# Patient Record
Sex: MALE | Race: AMERICAN INDIAN OR ALASKA NATIVE | NOT HISPANIC OR LATINO | Employment: UNEMPLOYED | URBAN - METROPOLITAN AREA
[De-identification: names, ages, dates, MRNs, and addresses within clinical notes are randomized per-mention and may not be internally consistent; named-entity substitution may affect disease eponyms.]

---

## 2018-06-14 ENCOUNTER — HOSPITAL ENCOUNTER (EMERGENCY)
Facility: MEDICAL CENTER | Age: 48
End: 2018-06-14
Attending: EMERGENCY MEDICINE
Payer: MEDICAID

## 2018-06-14 VITALS
RESPIRATION RATE: 18 BRPM | WEIGHT: 141.09 LBS | TEMPERATURE: 97.8 F | BODY MASS INDEX: 21.38 KG/M2 | OXYGEN SATURATION: 97 % | SYSTOLIC BLOOD PRESSURE: 138 MMHG | HEART RATE: 59 BPM | HEIGHT: 68 IN | DIASTOLIC BLOOD PRESSURE: 83 MMHG

## 2018-06-14 VITALS
TEMPERATURE: 97.8 F | RESPIRATION RATE: 20 BRPM | WEIGHT: 141 LBS | BODY MASS INDEX: 21.37 KG/M2 | HEIGHT: 68 IN | OXYGEN SATURATION: 100 % | DIASTOLIC BLOOD PRESSURE: 108 MMHG | SYSTOLIC BLOOD PRESSURE: 141 MMHG | HEART RATE: 69 BPM

## 2018-06-14 DIAGNOSIS — F11.93 OPIOID WITHDRAWAL (HCC): ICD-10-CM

## 2018-06-14 DIAGNOSIS — F11.93 NARCOTIC WITHDRAWAL (HCC): ICD-10-CM

## 2018-06-14 LAB
ANION GAP SERPL CALC-SCNC: 9 MMOL/L (ref 0–11.9)
BASOPHILS # BLD AUTO: 0.6 % (ref 0–1.8)
BASOPHILS # BLD: 0.07 K/UL (ref 0–0.12)
BUN SERPL-MCNC: 19 MG/DL (ref 8–22)
CALCIUM SERPL-MCNC: 10 MG/DL (ref 8.5–10.5)
CHLORIDE SERPL-SCNC: 109 MMOL/L (ref 96–112)
CO2 SERPL-SCNC: 25 MMOL/L (ref 20–33)
CREAT SERPL-MCNC: 0.62 MG/DL (ref 0.5–1.4)
EOSINOPHIL # BLD AUTO: 0.23 K/UL (ref 0–0.51)
EOSINOPHIL NFR BLD: 1.8 % (ref 0–6.9)
ERYTHROCYTE [DISTWIDTH] IN BLOOD BY AUTOMATED COUNT: 47.4 FL (ref 35.9–50)
GLUCOSE SERPL-MCNC: 97 MG/DL (ref 65–99)
HCT VFR BLD AUTO: 40.5 % (ref 42–52)
HGB BLD-MCNC: 14 G/DL (ref 14–18)
IMM GRANULOCYTES # BLD AUTO: 0.05 K/UL (ref 0–0.11)
IMM GRANULOCYTES NFR BLD AUTO: 0.4 % (ref 0–0.9)
LYMPHOCYTES # BLD AUTO: 3.65 K/UL (ref 1–4.8)
LYMPHOCYTES NFR BLD: 29.2 % (ref 22–41)
MCH RBC QN AUTO: 30.8 PG (ref 27–33)
MCHC RBC AUTO-ENTMCNC: 34.6 G/DL (ref 33.7–35.3)
MCV RBC AUTO: 89 FL (ref 81.4–97.8)
MONOCYTES # BLD AUTO: 1.29 K/UL (ref 0–0.85)
MONOCYTES NFR BLD AUTO: 10.3 % (ref 0–13.4)
NEUTROPHILS # BLD AUTO: 7.23 K/UL (ref 1.82–7.42)
NEUTROPHILS NFR BLD: 57.7 % (ref 44–72)
NRBC # BLD AUTO: 0 K/UL
NRBC BLD-RTO: 0 /100 WBC
PLATELET # BLD AUTO: 349 K/UL (ref 164–446)
PMV BLD AUTO: 8.5 FL (ref 9–12.9)
POTASSIUM SERPL-SCNC: 3.2 MMOL/L (ref 3.6–5.5)
RBC # BLD AUTO: 4.55 M/UL (ref 4.7–6.1)
SODIUM SERPL-SCNC: 143 MMOL/L (ref 135–145)
WBC # BLD AUTO: 12.5 K/UL (ref 4.8–10.8)

## 2018-06-14 PROCEDURE — 85025 COMPLETE CBC W/AUTO DIFF WBC: CPT

## 2018-06-14 PROCEDURE — 80048 BASIC METABOLIC PNL TOTAL CA: CPT

## 2018-06-14 PROCEDURE — 96372 THER/PROPH/DIAG INJ SC/IM: CPT

## 2018-06-14 PROCEDURE — 99284 EMERGENCY DEPT VISIT MOD MDM: CPT | Mod: 27

## 2018-06-14 PROCEDURE — 99284 EMERGENCY DEPT VISIT MOD MDM: CPT

## 2018-06-14 PROCEDURE — 700111 HCHG RX REV CODE 636 W/ 250 OVERRIDE (IP): Performed by: EMERGENCY MEDICINE

## 2018-06-14 RX ORDER — PHENOBARBITAL SODIUM 130 MG/ML
260 INJECTION, SOLUTION INTRAMUSCULAR; INTRAVENOUS ONCE
Status: COMPLETED | OUTPATIENT
Start: 2018-06-14 | End: 2018-06-14

## 2018-06-14 RX ADMIN — PHENOBARBITAL SODIUM 260 MG: 130 INJECTION INTRAMUSCULAR; INTRAVENOUS at 03:35

## 2018-06-14 ASSESSMENT — LIFESTYLE VARIABLES
DO YOU DRINK ALCOHOL: YES
TOTAL SCORE: 0
EVER FELT BAD OR GUILTY ABOUT YOUR DRINKING: NO
HAVE YOU EVER FELT YOU SHOULD CUT DOWN ON YOUR DRINKING: NO
EVER HAD A DRINK FIRST THING IN THE MORNING TO STEADY YOUR NERVES TO GET RID OF A HANGOVER: NO
TOTAL SCORE: 0
CONSUMPTION TOTAL: INCOMPLETE
TOTAL SCORE: 0
HAVE PEOPLE ANNOYED YOU BY CRITICIZING YOUR DRINKING: NO

## 2018-06-14 ASSESSMENT — PAIN SCALES - GENERAL: PAINLEVEL_OUTOF10: 7

## 2018-06-14 NOTE — DISCHARGE PLANNING
MSW met with pt who want to get established with a [facility] clinic. MSW to [facility]. Pt can walk in for an evaluation. MSW gave pt cab voucher#[phone] [facility]. Pt give list of methoadone clinics and substance abuse resources.

## 2018-06-14 NOTE — DISCHARGE INSTRUCTIONS
"Narcotic Withdrawal  When drug use interferes with normal living activities and relationships, it is abuse. Abuse includes problems with family and friends. Psychological dependence has developed when your mind tells you that the drug is needed. This is usually followed by a physical dependence in which you need more of the drug to get the same feeling or \"high.\" This is known as addiction or chemical dependency. Risk is greater when chemical dependency exists in the family.  SYMPTOMS   When tolerance to narcotics has developed, stopping of the narcotic suddenly can cause uncomfortable physical symptoms. Most of the time these are mild and consist of shakes or jitters (tremors) in the hands,a rapid heart rate, rapid breathing, and temperature. Sometimes these symptoms are associated with anxiety, panic attacks, and bad dreams. Other symptoms include:  · Irritability.  · Anxiety.  · Runny nose.  · \"Goose flesh.\"  · Diarrhea.  · Feeling sick to the stomach (nauseous).  · Muscle spasms.  · Sleeplessness.  · Chills.  · Sweats.  · Drug cravings.  · Confusion.  The severity of the withdrawal is based on the individual and varies from person to person. Many people choose to continue using narcotics to get rid of the discomfort of withdrawal. They also use to try to feel normal.  TREATMENT   Quitting an addiction means stopping use of all chemicals. This is hard but may save your life. With continual drug use, possible outcomes are often loss of self respect and esteem, violence, death, and eventually [facility] if the use of narcotics has led to the death of another.  Addiction cannot be cured, but it can be stopped. This often requires outside help and the care of professionals. Most hospitals and clinics can refer you to a specialized care center.  It is not necessary for you to go through the uncomfortable symptoms of withdrawal. Your caregiver can provide you with medications that will help you through this difficult " period. Try to avoid situations, friends, or alcohol, which may have made it possible for you to continue using narcotics in the past. Learn how to say no!  HOME CARE INSTRUCTIONS   · Drink fluids, get plenty of rest, and take hot baths.  · Medicines may be prescribed to help control withdrawal symptoms.  · Over-the-counter medicines may be helpful to control diarrhea or an upset stomach.  · If your problems resulted from taking prescription pain medicines, make sure you have a follow-up visit with your caregiver within the next few days. Be open about this problem.  · If you are dependent or addicted to street drugs, contact a local drug and alcohol treatment center or Narcotics Anonymous.  · Have someone with you to monitor your symptoms.  · Engage in healthy activities with friends who do not use drugs.  · Stay away from the drug scene.  It takes a long period of time to overcome addictions to all drugs. There may be times when you feel as though you want to use. Following loss of a physical addiction and going through withdrawal, you have conquered the most difficult part of getting rid of an addiction. Gradually, you will have a lessening of the craving that is telling you that you need narcotics to feel normal. Call your caregiver or a member of your support group if more support is needed. Learn who to talk to in your family and among your friends so that during these periods you can receive outside help.  SEEK IMMEDIATE MEDICAL CARE IF:   · You have vomiting that cannot be controlled, especially if you cannot keep liquids down.  · You are seeing things or hearing voices that are not really there (hallucinating).  · You have a seizure.  Document Released: 03/09/2004 Document Revised: 03/11/2013 Document Reviewed: 12/13/2009  ExitCare® Patient Information ©2014 [employer].

## 2018-06-14 NOTE — ED TRIAGE NOTES
"Pt discharged from ED this AM. Pt was supposed to \"get a ride\" to methadone clinic. Pt missed his 0500 appt this AM and now c/o body aches.   "

## 2018-06-14 NOTE — ED TRIAGE NOTES
".Alfredo Guzman  .47 y.o.  .  Chief Complaint   Patient presents with   • Drug Withdrawal     pt states he is withdrawling from heroin and morphine, last used 06/12 at midnight; vomiting, chills, shaking, \"i feel like i'm gonna have seizures\"; denies alcohol use     Patient ambulatory to triage [facility] for above complaints; NAD observed in triage. Pt has hx of back problems, denies further history. Pt has appointment today at 0500 with the methadone clinic in [facility] but doesn't think he can make it until then and is afraid that he will have a seizure before then. Pt is neurologically intact w/ no s/s of seizure at this time.   Patient A&O X4, speaking in full sentences, and responding appropriately to questions.   Patient placed in lobby and educated to notify staff of new or worsening symptoms.     "

## 2018-06-14 NOTE — ED PROVIDER NOTES
"ED Provider Note    Scribed for Nena Ly M.D. by Keke Lipscomb. 6/14/2018, 3:16 AM.    Primary care provider: Mamadou Mathews M.D.  Means of arrival: walk in   History obtained from: Patient  History limited by: none     CHIEF COMPLAINT  Chief Complaint   Patient presents with   • Drug Withdrawal     pt states he is withdrawling from heroin and morphine, last used 06/12 at midnight; vomiting, chills, shaking, \"i feel like i'm gonna have seizures\"; denies alcohol use       HPI  Alfredo Guzman is a 47 y.o. male who presents to the Emergency Department for evaluation of drug withdrawal onset 2 days ago. Patient reports that he is currently withdrawing from heroin and morphine. He reports associated nausea, vomiting, chills, a \"skin crawling\" sensation, diarrhea, back pain, and shaking. Patient does have an appointment with the methadone clinic but states his symptoms are worsening and he cannot handle it anymore. No complaints of hallucinations.     REVIEW OF SYSTEMS  Positives as above. Pertinent negatives include hallucinations.    E      PAST MEDICAL HISTORY   has a past medical history of Anxiety; Back pain; Depression; Psychiatric disorder; and Seizure disorder (HCC).    SURGICAL HISTORY   has a past surgical history that includes other orthopedic surgery (x2 ).    SOCIAL HISTORY  Social History   Substance Use Topics   • Smoking status: Current Every Day Smoker     Packs/day: 1.00     Years: 10.00   • Smokeless tobacco: Never Used   • Alcohol use No      History   Drug Use   • Types: Intravenous     Comment: herion and morphine        FAMILY HISTORY  History reviewed. No pertinent family history.    CURRENT MEDICATIONS  Reviewed. See Encounter Summary.     ALLERGIES  No Known Allergies    PHYSICAL EXAM  VITAL SIGNS: /83   Pulse 78   Temp 36.6 °C (97.8 °F)   Resp 18   Ht 1.727 m (5' 8\")   Wt 64 kg (141 lb 1.5 oz)   SpO2 99%   BMI 21.45 kg/m²    Pulse ox interpretation: I interpret this pulse ox as " "normal.  Constitutional: Patient is agitated. He appears uncomfortable and states he feels his \"skin is crawling\".   HENT: Normocephalic, Atraumatic, mildly dry mucous membranes. Poor dentition.   Eyes: Conjunctiva normal, non-icteric. Pupils are 8 mm bilaterally.   Heart: Regular rate and rythm, no murmurs.    Lungs: Clear to auscultation bilaterally. No resp distress, breath sounds equal b/l  Abdomen: Non-tender, non-distended, normal bowel sounds  Skin: Warm, Dry, No erythema, No rash.   Neurologic: Alert and oriented, Grossly non-focal. No hallucinations.    DIFFERENTIAL DIAGNOSIS AND WORK UP PLAN    3:16 AM Patient seen and examined at bedside. The patient presents with drug withdrawal and the differential diagnosis includes but is not limited to Acute dehydration, opoid withdrawal, acute kidney injury. Ordered for CBC, BMP to evaluate. Patient will be treated with phenobarbital injection 260 mg for his symptoms.     DIAGNOSTIC STUDIES / PROCEDURES     LABS  CBC with a mildly elevated white blood cell count otherwise within normal limits, BMP potassium 3.2 otherwise also normal  All labs were reviewed by me.    COURSE & MEDICAL DECISION MAKING  Pertinent Labs & Imaging studies reviewed. (See chart for details)    4:02 AM- Reviewed the patient's lab results.     4:32 AM- Re-examined; The patient is resting in bed. He is feeling improved. I discussed his above findings were overall unremarkable and plans for discharge.  He is no longer writhing and there is no evidence of hallucinations he is not overtly dehydrated and now severe electrolyte abnormalities, encouraged patient to follow up with the [facility] for his methadone which is where he is planning to go at 5 AM this morning.  He was instructed to return to the ED if his symptoms worsen. Patient understands and agrees. His vitals prior to discharge are:     /74   Pulse (!) 59   Temp 36.6 °C (97.8 °F)   Resp 18   Ht 1.727 m " "(5' 8\")   Wt 64 kg (141 lb 1.5 oz)   SpO2 97%   BMI 21.45 kg/m²     The patient will return for new or worsening symptoms and is stable at the time of discharge.    DISPOSITION:  Patient will be discharged home in stable condition.    FOLLOW UP:  Mamadou Mathews M.D.  [address]  [address]  [facility] [address] [phone]    Schedule an appointment as soon as possible for a visit      [facility], Emergency Dept  [address]  [facility] [address] [phone]    If symptoms worsen    [facility]  [address]  [address]  [facility] [id number] [phone]    Go to      FINAL IMPRESSION  1. Opioid withdrawal (HCC)        Keke SINCLAIR (Scribe), am scribing for, and in the presence of, Nena Ly M.D..    Electronically signed by: Keke Lipscomb (Scribe), 6/14/2018    Nena SINCLAIR M.D. personally performed the services described in this documentation, as scribed by Keke Lipscomb in my presence, and it is both accurate and complete.    The note accurately reflects work and decisions made by me.  Nena Ly  6/14/2018  5:35 AM    This dictation has been created using voice recognition software and/or scribes. The accuracy of the dictation is limited by the abilities of the software and the expertise of the scribes. I expect there may be some errors of grammar and possibly content. I made every attempt to manually correct the errors within my dictation. However, errors related to voice recognition software and/or scribes may still exist and should be interpreted within the appropriate context.    "

## 2018-06-14 NOTE — ED PROVIDER NOTES
"ED Provider Note    CHIEF COMPLAINT  Chief Complaint   Patient presents with   • Other       HPI  Alfredo Guzman is a 47 y.o. male who presents because he missed his appointment at the methadone clinic. The patient was here last night for narcotic withdrawal. He is treated with phenobarbital. He is not having any withdrawal symptoms at this time. He states that someone was supposed to pick him up to take him to his 5 AM appointment and they never picked him up. He therefore has come here not knowing what else to do. He has no physical complaints at this time.    REVIEW OF SYSTEMS  See HPI for further details. All other systems are negative.     PAST MEDICAL HISTORY  Past Medical History:   Diagnosis Date   • Anxiety    • Back pain    • Depression    • Psychiatric disorder    • Seizure disorder (HCC)        FAMILY HISTORY  History reviewed. No pertinent family history.    SOCIAL HISTORY  Social History     Social History   • Marital status: Single     Spouse name: N/A   • Number of children: N/A   • Years of education: N/A     Social History Main Topics   • Smoking status: Current Every Day Smoker     Packs/day: 1.00     Years: 10.00   • Smokeless tobacco: Never Used   • Alcohol use No   • Drug use: Yes     Types: Intravenous      Comment: herion and morphine    • Sexual activity: Not on file     Other Topics Concern   • Not on file     Social History Narrative   • No narrative on file       SURGICAL HISTORY  Past Surgical History:   Procedure Laterality Date   • OTHER ORTHOPEDIC SURGERY  x2     low back surgery \"on my discs\"       CURRENT MEDICATIONS  Home Medications     Reviewed by Ericka Loaiza RTEX (Registered Nurse) on 06/14/18 at 0941  Med List Status: Partial   Medication Last Dose Status   gabapentin (NEURONTIN) 300 MG CAPS prn Active   lamotrigine (LAMICTAL) 25 MG TABS Taking Active   lidocaine (LIDODERM) 5 % Patch  Active   methylPREDNISolone (MEDROL, ANDREINA,) 4 MG tablet  Active   penicillin v potassium " "(VEETID) 500 MG TABS Taking Active                ALLERGIES  No Known Allergies    PHYSICAL EXAM  VITAL SIGNS: /108   Pulse 69   Temp 36.6 °C (97.8 °F)   Resp 20   Ht 1.727 m (5' 8\")   Wt 64 kg (141 lb)   SpO2 100%   BMI 21.44 kg/m²     Constitutional: Well developed, Well nourished, No acute distress, Non-toxic appearance.   HENT: Normocephalic, Atraumatic.   Eyes:  EOMI, Conjunctiva normal, No discharge.   Cardiovascular: Normal heart rate.   Thorax & Lungs: No respiratory distress.  Skin: Warm, Dry.   Musculoskeletal: Good range of motion in all major joints.  Neurologic: Awake alert.    COURSE & MEDICAL DECISION MAKING  Pertinent Labs & Imaging studies reviewed. (See chart for details)  This is a 47-year-old here for evaluation of narcotic withdrawal. He was seen here last night for symptoms of withdrawal. His laboratory workup was unremarkable. He was treated with phenobarbital. He currently is not having active withdrawal symptoms. I have discussed the case with our . At this point the patient will be provided transport to the methadone clinic were he is able to walk in for evaluation and establish care.    FINAL IMPRESSION  1. Evaluation of narcotic withdrawal  2.   3.         Electronically signed by: Emerson Ma, 6/14/2018 10:10 AM    "

## 2018-06-14 NOTE — ED NOTES
.Patient states understanding of discharge instructions. Ambulated with steady gait out of ED. Personal belongings with patient. Patient given cab voucher for transport to methadone clinic by

## 2018-06-14 NOTE — ED NOTES
Patient given discharge teaching and education. Given chance to ask questions, all questions answered. Educated patient of signs and symptoms to returned to ER, and educated patient they can return for any concerning symptoms. Patient states they have their belongings. Denies additional needs at this time. Reports pain is well controlled. Patient monitored every hour and PRN for safety and comfort. Patient ambulatory out of department.

## 2018-10-01 ENCOUNTER — HOSPITAL ENCOUNTER (EMERGENCY)
Dept: HOSPITAL 8 - ED | Age: 48
Discharge: HOME | End: 2018-10-01
Payer: COMMERCIAL

## 2018-10-01 VITALS — SYSTOLIC BLOOD PRESSURE: 108 MMHG | DIASTOLIC BLOOD PRESSURE: 59 MMHG

## 2018-10-01 VITALS — HEIGHT: 68 IN | BODY MASS INDEX: 23.05 KG/M2 | WEIGHT: 152.12 LBS

## 2018-10-01 DIAGNOSIS — F17.200: ICD-10-CM

## 2018-10-01 DIAGNOSIS — S80.12XA: Primary | ICD-10-CM
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
## 2018-10-01 DIAGNOSIS — Z60.2: ICD-10-CM
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
## 2018-10-01 PROCEDURE — 73590 X-RAY EXAM OF LOWER LEG: CPT

## 2018-10-01 PROCEDURE — 73610 X-RAY EXAM OF ANKLE: CPT

## 2018-10-01 PROCEDURE — 96372 THER/PROPH/DIAG INJ SC/IM: CPT

## 2018-10-01 PROCEDURE — 99284 EMERGENCY DEPT VISIT MOD MDM: CPT

## 2018-10-01 SDOH — SOCIAL STABILITY - SOCIAL INSECURITY: PROBLEMS RELATED TO LIVING ALONE: Z60.2

## 2018-12-21 ENCOUNTER — HOSPITAL ENCOUNTER (EMERGENCY)
Dept: HOSPITAL 8 - ED | Age: 48
Discharge: HOME | End: 2018-12-21
Payer: MEDICAID

## 2018-12-21 VITALS — SYSTOLIC BLOOD PRESSURE: 109 MMHG | DIASTOLIC BLOOD PRESSURE: 61 MMHG

## 2018-12-21 VITALS — WEIGHT: 157.63 LBS | HEIGHT: 68 IN | BODY MASS INDEX: 23.89 KG/M2

## 2018-12-21 DIAGNOSIS — J20.8: Primary | ICD-10-CM

## 2018-12-21 PROCEDURE — 99283 EMERGENCY DEPT VISIT LOW MDM: CPT

## 2018-12-21 PROCEDURE — 93005 ELECTROCARDIOGRAM TRACING: CPT

## 2018-12-21 PROCEDURE — 71046 X-RAY EXAM CHEST 2 VIEWS: CPT

## 2019-02-28 ENCOUNTER — APPOINTMENT (OUTPATIENT)
Dept: PHYSICAL THERAPY | Facility: REHABILITATION | Age: 49
End: 2019-02-28
Payer: MEDICAID

## 2019-03-04 ENCOUNTER — HOSPITAL ENCOUNTER (EMERGENCY)
Dept: HOSPITAL 8 - ED | Age: 49
Discharge: HOME | End: 2019-03-04
Payer: MEDICAID

## 2019-03-04 VITALS — WEIGHT: 165.35 LBS | BODY MASS INDEX: 25.06 KG/M2 | HEIGHT: 68 IN

## 2019-03-04 VITALS — SYSTOLIC BLOOD PRESSURE: 120 MMHG | DIASTOLIC BLOOD PRESSURE: 74 MMHG

## 2019-03-04 DIAGNOSIS — R05: Primary | ICD-10-CM

## 2019-03-04 DIAGNOSIS — F17.200: ICD-10-CM

## 2019-03-04 PROCEDURE — 99283 EMERGENCY DEPT VISIT LOW MDM: CPT

## 2019-03-04 PROCEDURE — 71046 X-RAY EXAM CHEST 2 VIEWS: CPT

## 2019-03-13 ENCOUNTER — HOSPITAL ENCOUNTER (OUTPATIENT)
Dept: RADIOLOGY | Facility: MEDICAL CENTER | Age: 49
End: 2019-03-13
Attending: NURSE PRACTITIONER
Payer: MEDICAID

## 2019-03-13 VITALS
SYSTOLIC BLOOD PRESSURE: 103 MMHG | DIASTOLIC BLOOD PRESSURE: 57 MMHG | OXYGEN SATURATION: 91 % | HEIGHT: 68 IN | TEMPERATURE: 98.7 F | BODY MASS INDEX: 22.73 KG/M2 | RESPIRATION RATE: 16 BRPM | WEIGHT: 150 LBS | HEART RATE: 54 BPM

## 2019-03-13 DIAGNOSIS — M54.5 LOW BACK PAIN, UNSPECIFIED BACK PAIN LATERALITY, UNSPECIFIED CHRONICITY, WITH SCIATICA PRESENCE UNSPECIFIED: ICD-10-CM

## 2019-03-13 PROCEDURE — 01922 ANES N-INVAS IMG/RADJ THER: CPT

## 2019-03-13 PROCEDURE — 72148 MRI LUMBAR SPINE W/O DYE: CPT

## 2019-03-13 PROCEDURE — 700111 HCHG RX REV CODE 636 W/ 250 OVERRIDE (IP)

## 2019-03-13 NOTE — PROGRESS NOTES
Pt ambulatory to MRI Nurses station for scheduled MRI with Anesthesia.  PIV established.  Pt spoke with Dr. Oleary.  Consent signed.  MRI completed.  To recovery.  Tolerated juice.  Discharge instructions given to pt and  with verbalized understanding.  Pt ambulatory out of the Imaging Department.  Lynn, pt's friend, to drive pt home.

## 2019-03-13 NOTE — DISCHARGE INSTRUCTIONS
MRI ADULT DISCHARGE INSTRUCTIONS    You have been medicated today for your scan. Please follow the instructions below to ensure your safe recovery. If you have any questions or problems, feel free to call us at [phone] or [phone].     1.   Have someone stay with you to assist you as needed.    2.   Do not drive or operate any mechanical devices.    3.   Do not perform any activity that requires concentration. Make no major decisions over the next 24 hours.     4.   Be careful changing positions from laying down to sitting or standing, as you may become dizzy.     5.   Do not drink alcohol for 48 hours.    6.   There are no restrictions for eating your normal meals. Drink fluids.    7.   You may continue your usual medications for pain, tranquilizers, muscle relaxants or sedatives when awake.     8.   Tomorrow, you may continue your normal daily activities.     9.   Pressure dressing on 10 - 15 minutes. If swelling or bleeding occurs when removed, continue placing direct pressure on injection site for another 5 minutes, or until bleeding stops.     I have been informed of and understand the above discharge instructions.

## 2019-03-14 ENCOUNTER — PHYSICAL THERAPY (OUTPATIENT)
Dept: PHYSICAL THERAPY | Facility: REHABILITATION | Age: 49
End: 2019-03-14
Attending: NURSE PRACTITIONER
Payer: MEDICAID

## 2019-03-14 DIAGNOSIS — M54.5 LOW BACK PAIN, UNSPECIFIED BACK PAIN LATERALITY, UNSPECIFIED CHRONICITY, WITH SCIATICA PRESENCE UNSPECIFIED: ICD-10-CM

## 2019-03-14 PROCEDURE — 97162 PT EVAL MOD COMPLEX 30 MIN: CPT

## 2019-03-14 ASSESSMENT — ENCOUNTER SYMPTOMS
PAIN TIMING: ALL DAY
EXACERBATED BY: PROLONGED STANDING
QUALITY: BURNING
PAIN SCALE AT HIGHEST: 9
PAIN SCALE: 9
PAIN TIMING: CONSTANT
PAIN SCALE AT LOWEST: 9
EXACERBATED BY: PROLONGED SITTING
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
## 2019-03-20 ENCOUNTER — HOSPITAL ENCOUNTER (EMERGENCY)
Dept: HOSPITAL 8 - ED | Age: 49
Discharge: HOME | End: 2019-03-20
Payer: MEDICAID

## 2019-03-20 VITALS — SYSTOLIC BLOOD PRESSURE: 134 MMHG | DIASTOLIC BLOOD PRESSURE: 82 MMHG

## 2019-03-20 VITALS — HEIGHT: 68 IN | WEIGHT: 159.17 LBS | BODY MASS INDEX: 24.12 KG/M2

## 2019-03-20 DIAGNOSIS — R11.2: Primary | ICD-10-CM

## 2019-03-20 DIAGNOSIS — F17.200: ICD-10-CM

## 2019-03-20 PROCEDURE — 99283 EMERGENCY DEPT VISIT LOW MDM: CPT
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
## 2019-03-20 NOTE — NUR
Pt passed PO  challenge of 30 mL of water. Patient given discharge instructions 
and they have confirmed that they understand the instructions.  Patient 
ambulatory with steady gait. Pt left with prescription, discharge paperwork, 
and all personal belongings.

## 2019-03-20 NOTE — NUR
Pt sitting on edge of riya. FARRAH. No N/V/D at this time. Pt states that 
nausea has subsided since recieving medicaiton from [facility]. No needs expressed at 
this time. All safety measures in place.

## 2019-03-21 ENCOUNTER — TELEPHONE (OUTPATIENT)
Dept: PHYSICAL THERAPY | Facility: REHABILITATION | Age: 49
End: 2019-03-21

## 2019-03-21 NOTE — OP THERAPY DISCHARGE SUMMARY
Outpatient Physical Therapy  DISCHARGE SUMMARY NOTE      [facility] [address]  [address]  [place] [address]  Phone:  [phone]  Fax:  [phone]    Date of Visit: 03/21/2019    Patient: Alfredo Guzman  YOB: 1970  MRN: [id number]     Referring Provider: No referring provider defined for this encounter.   Referring Diagnosis No admission diagnoses are documented for this encounter.     Physical Therapy Occurrence Codes    Date of onset of impairment:  2/19/19   Date physical therapy care plan established or reviewed:  3/14/19   Date physical therapy treatment started:  3/14/19          Functional Limitations and Severity Modifiers      Goal:     Discharge:         Your patient is being discharged from Physical Therapy with the following comments:   · Patient cancelled or missed more than 2 scheduled appointments/non-compliant    Comments:   Patient seen for initial evaluation only.  Please see initial evaluation for details.      Limitations Remaining:  Please see eval    Recommendations:  Discharge from PT    Shayy Brown PT, MSPT    Date: 3/21/2019

## 2019-03-26 ENCOUNTER — APPOINTMENT (OUTPATIENT)
Dept: PHYSICAL THERAPY | Facility: REHABILITATION | Age: 49
End: 2019-03-26
Attending: NURSE PRACTITIONER
Payer: MEDICAID

## 2019-03-30 ENCOUNTER — HOSPITAL ENCOUNTER (EMERGENCY)
Dept: HOSPITAL 8 - ED | Age: 49
Discharge: HOME | End: 2019-03-30
Payer: MEDICAID

## 2019-03-30 VITALS — BODY MASS INDEX: 24.29 KG/M2 | HEIGHT: 68 IN | WEIGHT: 160.28 LBS

## 2019-03-30 VITALS — DIASTOLIC BLOOD PRESSURE: 62 MMHG | SYSTOLIC BLOOD PRESSURE: 100 MMHG

## 2019-03-30 DIAGNOSIS — D72.829: ICD-10-CM

## 2019-03-30 DIAGNOSIS — R11.2: ICD-10-CM

## 2019-03-30 DIAGNOSIS — F17.200: ICD-10-CM

## 2019-03-30 DIAGNOSIS — R10.84: Primary | ICD-10-CM

## 2019-03-30 LAB
<PLATELET ESTIMATE>: ADEQUATE
<PLT MORPHOLOGY>: (no result)
ALBUMIN SERPL-MCNC: 3.4 G/DL (ref 3.4–5)
ALP SERPL-CCNC: 90 U/L (ref 45–117)
ALT SERPL-CCNC: 71 U/L (ref 12–78)
ANION GAP SERPL CALC-SCNC: 1 MMOL/L (ref 5–15)
BAND#(MANUAL): 0.55 X10^3/UL
BILIRUB DIRECT SERPL-MCNC: NORMAL MG/DL
BILIRUB SERPL-MCNC: 0.3 MG/DL (ref 0.2–1)
CALCIUM SERPL-MCNC: 8.7 MG/DL (ref 8.5–10.1)
CHLORIDE SERPL-SCNC: 110 MMOL/L (ref 98–107)
CREAT SERPL-MCNC: 0.81 MG/DL (ref 0.7–1.3)
EOS#(MANUAL): 0.36 X10^3/UL (ref 0–0.4)
EOS% (MANUAL): 2 % (ref 1–7)
ERYTHROCYTE [DISTWIDTH] IN BLOOD BY AUTOMATED COUNT: 15.1 % (ref 9.4–14.8)
LYMPH#(MANUAL): 1.64 X10^3/UL (ref 1–3.4)
LYMPHS% (MANUAL): 9 % (ref 22–44)
MCH RBC QN AUTO: 30.4 PG (ref 27.5–34.5)
MCHC RBC AUTO-ENTMCNC: 32.7 G/DL (ref 33.2–36.2)
MCV RBC AUTO: 93 FL (ref 81–97)
MD: YES
METAMYELOCYTES# (MANUAL): 0.18 X10^3/UL (ref 0–0)
METAMYELOCYTES% (MANUAL): 1 % (ref 0–1)
MONOS#(MANUAL): 0.55 X10^3/UL (ref 0.3–2.7)
MONOS% (MANUAL): 3 % (ref 2–9)
MYELOCYTES# (MANUAL): 0.18 X10^3/UL (ref 0–0)
MYELOCYTES% (MANUAL): 1 % (ref 0–0)
NEUTS BAND NFR BLD: 3 % (ref 0–7)
PLATELET # BLD AUTO: 318 X10^3/UL (ref 130–400)
PMV BLD AUTO: 7.3 FL (ref 7.4–10.4)
PROT SERPL-MCNC: 6.9 G/DL (ref 6.4–8.2)
RBC # BLD AUTO: 5 X10^6/UL (ref 4.38–5.82)
SEG#(MANUAL): 14.74 X10^3/UL (ref 1.8–6.8)
SEGS% (MANUAL): 81 % (ref 42–75)

## 2019-03-30 PROCEDURE — 80053 COMPREHEN METABOLIC PANEL: CPT

## 2019-03-30 PROCEDURE — 36415 COLL VENOUS BLD VENIPUNCTURE: CPT

## 2019-03-30 PROCEDURE — 83690 ASSAY OF LIPASE: CPT

## 2019-03-30 PROCEDURE — 85025 COMPLETE CBC W/AUTO DIFF WBC: CPT

## 2019-03-30 PROCEDURE — 99284 EMERGENCY DEPT VISIT MOD MDM: CPT

## 2019-03-30 PROCEDURE — 74021 RADEX ABDOMEN 3+ VIEWS: CPT

## 2019-03-30 NOTE — NUR
Pt BIB [facility]-c/o LUQ abd pain, N/V starting this morning. Pt states nausea 
improved after phenergan that was administered by EMS. Pt placed in gown, 
positioned for comfort in bed with warm blankets. Continuous oxygen and BP 
monitors applied, all safety measures observed.

## 2019-03-30 NOTE — NUR
PT STATES HE NEEDS TO LEAVE BECAUSE [facility] CLOSING. MD AWARE. PT 
GIVEN DISCHARGE INSTRUCTIONS. ENCOURAGED PT TO RETURN TO ER IF NOT FEELING 
BETTER. MADE AWARE OF ELEVATED WBC AND THAT HIS XRAY SHOWS CONSTIPATION

## 2019-04-02 ENCOUNTER — APPOINTMENT (OUTPATIENT)
Dept: PHYSICAL THERAPY | Facility: REHABILITATION | Age: 49
End: 2019-04-02
Attending: NURSE PRACTITIONER
Payer: MEDICAID

## 2019-04-04 ENCOUNTER — APPOINTMENT (OUTPATIENT)
Dept: PHYSICAL THERAPY | Facility: REHABILITATION | Age: 49
End: 2019-04-04
Attending: NURSE PRACTITIONER
Payer: MEDICAID

## 2019-04-09 ENCOUNTER — APPOINTMENT (OUTPATIENT)
Dept: PHYSICAL THERAPY | Facility: REHABILITATION | Age: 49
End: 2019-04-09
Attending: NURSE PRACTITIONER
Payer: MEDICAID

## 2019-04-11 ENCOUNTER — APPOINTMENT (OUTPATIENT)
Dept: PHYSICAL THERAPY | Facility: REHABILITATION | Age: 49
End: 2019-04-11
Attending: NURSE PRACTITIONER
Payer: MEDICAID

## 2019-04-16 ENCOUNTER — APPOINTMENT (OUTPATIENT)
Dept: PHYSICAL THERAPY | Facility: REHABILITATION | Age: 49
End: 2019-04-16
Attending: NURSE PRACTITIONER
Payer: MEDICAID

## 2019-04-23 ENCOUNTER — HOSPITAL ENCOUNTER (EMERGENCY)
Dept: HOSPITAL 8 - ED | Age: 49
Discharge: HOME | End: 2019-04-23
Payer: MEDICAID

## 2019-04-23 VITALS — BODY MASS INDEX: 23.36 KG/M2 | HEIGHT: 68 IN | WEIGHT: 154.1 LBS

## 2019-04-23 VITALS — DIASTOLIC BLOOD PRESSURE: 72 MMHG | SYSTOLIC BLOOD PRESSURE: 118 MMHG

## 2019-04-23 DIAGNOSIS — A08.4: Primary | ICD-10-CM

## 2019-04-23 DIAGNOSIS — F17.200: ICD-10-CM

## 2019-04-23 DIAGNOSIS — K02.9: ICD-10-CM

## 2019-04-23 LAB
<PLATELET ESTIMATE>: ADEQUATE
<PLT MORPHOLOGY>: (no result)
ALBUMIN SERPL-MCNC: 4.2 G/DL (ref 3.4–5)
ALP SERPL-CCNC: 148 U/L (ref 45–117)
ALT SERPL-CCNC: 263 U/L (ref 12–78)
ANION GAP SERPL CALC-SCNC: 6 MMOL/L (ref 5–15)
ANISOCYTOSIS BLD QL SMEAR: (no result)
BAND#(MANUAL): 1.49 X10^3/UL
BASOPHILS NFR BLD MANUAL: 1 % (ref 0–1)
BASOS#(MANUAL): 0.21 X10^3/UL (ref 0–0.1)
BILIRUB SERPL-MCNC: 0.3 MG/DL (ref 0.2–1)
CALCIUM SERPL-MCNC: 9.4 MG/DL (ref 8.5–10.1)
CHLORIDE SERPL-SCNC: 108 MMOL/L (ref 98–107)
CREAT SERPL-MCNC: 0.77 MG/DL (ref 0.7–1.3)
EOS#(MANUAL): 0.21 X10^3/UL (ref 0–0.4)
EOS% (MANUAL): 1 % (ref 1–7)
ERYTHROCYTE [DISTWIDTH] IN BLOOD BY AUTOMATED COUNT: 14.8 % (ref 9.4–14.8)
LYMPH#(MANUAL): 1.92 X10^3/UL (ref 1–3.4)
LYMPHS% (MANUAL): 9 % (ref 22–44)
MCH RBC QN AUTO: 30.9 PG (ref 27.5–34.5)
MCHC RBC AUTO-ENTMCNC: 33.6 G/DL (ref 33.2–36.2)
MCV RBC AUTO: 92 FL (ref 81–97)
MD: YES
MONOS#(MANUAL): 0.64 X10^3/UL (ref 0.3–2.7)
MONOS% (MANUAL): 3 % (ref 2–9)
NEUTS BAND NFR BLD: 7 % (ref 0–7)
PLATELET # BLD AUTO: 373 X10^3/UL (ref 130–400)
PMV BLD AUTO: 7.4 FL (ref 7.4–10.4)
PROT SERPL-MCNC: 8.2 G/DL (ref 6.4–8.2)
RBC # BLD AUTO: 5.58 X10^6/UL (ref 4.38–5.82)
SEG#(MANUAL): 16.83 X10^3/UL (ref 1.8–6.8)
SEGS% (MANUAL): 79 % (ref 42–75)

## 2019-04-23 PROCEDURE — 85025 COMPLETE CBC W/AUTO DIFF WBC: CPT

## 2019-04-23 PROCEDURE — 87040 BLOOD CULTURE FOR BACTERIA: CPT

## 2019-04-23 PROCEDURE — 86706 HEP B SURFACE ANTIBODY: CPT

## 2019-04-23 PROCEDURE — 86704 HEP B CORE ANTIBODY TOTAL: CPT

## 2019-04-23 PROCEDURE — 80053 COMPREHEN METABOLIC PANEL: CPT

## 2019-04-23 PROCEDURE — 87340 HEPATITIS B SURFACE AG IA: CPT

## 2019-04-23 PROCEDURE — 76700 US EXAM ABDOM COMPLETE: CPT

## 2019-04-23 PROCEDURE — 36415 COLL VENOUS BLD VENIPUNCTURE: CPT

## 2019-04-23 PROCEDURE — 86803 HEPATITIS C AB TEST: CPT

## 2019-04-23 PROCEDURE — 74177 CT ABD & PELVIS W/CONTRAST: CPT

## 2019-04-23 PROCEDURE — 99284 EMERGENCY DEPT VISIT MOD MDM: CPT

## 2019-04-23 PROCEDURE — 86708 HEPATITIS A ANTIBODY: CPT

## 2019-04-23 PROCEDURE — 96365 THER/PROPH/DIAG IV INF INIT: CPT

## 2019-04-23 PROCEDURE — 83690 ASSAY OF LIPASE: CPT

## 2019-04-23 NOTE — NUR
LUNCH RN: PT PRESENTING TO ER FOR N/V AND UPPER LEFT/EPIGASTRIC PAIN STARTING 
TODAY AS WELL AS LEFT LOWER JAW PAIN. PT REPORTS HEROIN USE DAY BEFORE 
YESTERDAY BUT IS ATTEMPTING TO STOP BY GOING TO [facility]. CONNECTED TO 
MONITORING, VSS. PT MEDCIATED PER MAR FOR NAUSEA. LABS COLLECTED AND MD 
ASSESSMENT COMPLETE. CALL LIGHT WITHIN REACH. AWAITING RESULTS AND RECHECK AT 
THIS TIME

## 2019-04-23 NOTE — NUR
PT. REMAINS MONITORED AND IS RESTING WITHOUT CONCERNS.  VSS.  SIDERAILS REMAIN 
UP X 2 WITH THE CALL LIGHT IN PLACE.  PT.'S HOB IS ELEVATED GREATER THAN 30 
DEGREES.

## 2019-08-11 ENCOUNTER — HOSPITAL ENCOUNTER (EMERGENCY)
Dept: HOSPITAL 8 - ED | Age: 49
Discharge: HOME | End: 2019-08-11
Payer: MEDICAID

## 2019-08-11 VITALS — WEIGHT: 150.13 LBS | BODY MASS INDEX: 22.24 KG/M2 | HEIGHT: 69 IN

## 2019-08-11 VITALS — SYSTOLIC BLOOD PRESSURE: 114 MMHG | DIASTOLIC BLOOD PRESSURE: 72 MMHG

## 2019-08-11 DIAGNOSIS — K02.9: Primary | ICD-10-CM

## 2019-08-11 DIAGNOSIS — K04.7: ICD-10-CM

## 2019-08-11 DIAGNOSIS — F17.200: ICD-10-CM

## 2019-08-11 PROCEDURE — 99283 EMERGENCY DEPT VISIT LOW MDM: CPT

## 2019-12-07 ENCOUNTER — HOSPITAL ENCOUNTER (EMERGENCY)
Dept: HOSPITAL 8 - ED | Age: 49
Discharge: HOME | End: 2019-12-07
Payer: MEDICAID

## 2019-12-07 VITALS — DIASTOLIC BLOOD PRESSURE: 51 MMHG | SYSTOLIC BLOOD PRESSURE: 134 MMHG

## 2019-12-07 VITALS — BODY MASS INDEX: 25.79 KG/M2 | WEIGHT: 170.2 LBS | HEIGHT: 68 IN

## 2019-12-07 DIAGNOSIS — F11.10: ICD-10-CM

## 2019-12-07 DIAGNOSIS — K08.89: ICD-10-CM

## 2019-12-07 DIAGNOSIS — K02.9: Primary | ICD-10-CM

## 2019-12-07 DIAGNOSIS — F17.200: ICD-10-CM

## 2019-12-07 PROCEDURE — 99284 EMERGENCY DEPT VISIT MOD MDM: CPT

## 2019-12-07 PROCEDURE — 64402: CPT

## 2020-02-05 ENCOUNTER — HOSPITAL ENCOUNTER (EMERGENCY)
Dept: HOSPITAL 8 - ED | Age: 50
Discharge: HOME | End: 2020-02-05
Payer: MEDICAID

## 2020-02-05 VITALS — WEIGHT: 163.14 LBS | HEIGHT: 68 IN | BODY MASS INDEX: 24.73 KG/M2

## 2020-02-05 VITALS — DIASTOLIC BLOOD PRESSURE: 74 MMHG | SYSTOLIC BLOOD PRESSURE: 124 MMHG

## 2020-02-05 DIAGNOSIS — K08.89: Primary | ICD-10-CM

## 2020-02-05 DIAGNOSIS — F17.200: ICD-10-CM

## 2020-02-05 PROCEDURE — 99283 EMERGENCY DEPT VISIT LOW MDM: CPT

## 2020-03-25 ENCOUNTER — HOSPITAL ENCOUNTER (EMERGENCY)
Dept: HOSPITAL 8 - ED | Age: 50
Discharge: HOME | End: 2020-03-25
Payer: MEDICAID

## 2020-03-25 VITALS — WEIGHT: 158.73 LBS | HEIGHT: 68 IN | BODY MASS INDEX: 24.06 KG/M2

## 2020-03-25 VITALS — DIASTOLIC BLOOD PRESSURE: 60 MMHG | SYSTOLIC BLOOD PRESSURE: 108 MMHG

## 2020-03-25 DIAGNOSIS — K59.00: Primary | ICD-10-CM

## 2020-03-25 DIAGNOSIS — R11.2: ICD-10-CM

## 2020-03-25 DIAGNOSIS — F17.210: ICD-10-CM

## 2020-03-25 LAB
<PLATELET ESTIMATE>: ADEQUATE
<PLT MORPHOLOGY>: (no result)
ALBUMIN SERPL-MCNC: 3.9 G/DL (ref 3.4–5)
ALP SERPL-CCNC: 101 U/L (ref 45–117)
ALT SERPL-CCNC: 16 U/L (ref 12–78)
ANION GAP SERPL CALC-SCNC: 5 MMOL/L (ref 5–15)
BAND#(MANUAL): 0.53 X10^3/UL
BILIRUB DIRECT SERPL-MCNC: NORMAL MG/DL
BILIRUB SERPL-MCNC: 0.3 MG/DL (ref 0.2–1)
CALCIUM SERPL-MCNC: 9.6 MG/DL (ref 8.5–10.1)
CHLORIDE SERPL-SCNC: 106 MMOL/L (ref 98–107)
CREAT SERPL-MCNC: 0.72 MG/DL (ref 0.7–1.3)
CULTURE INDICATED?: NO
ERYTHROCYTE [DISTWIDTH] IN BLOOD BY AUTOMATED COUNT: 14.7 % (ref 9.4–14.8)
LYMPH#(MANUAL): 3.2 X10^3/UL (ref 1–3.4)
LYMPHS% (MANUAL): 18 % (ref 22–44)
MCH RBC QN AUTO: 31 PG (ref 27.5–34.5)
MCHC RBC AUTO-ENTMCNC: 33 G/DL (ref 33.2–36.2)
MCV RBC AUTO: 94 FL (ref 81–97)
MD: YES
MICROSCOPIC: (no result)
MONOS#(MANUAL): 0.53 X10^3/UL (ref 0.3–2.7)
MONOS% (MANUAL): 3 % (ref 2–9)
NEUTS BAND NFR BLD: 3 % (ref 0–7)
PLATELET # BLD AUTO: 332 X10^3/UL (ref 130–400)
PMV BLD AUTO: 7.3 FL (ref 7.4–10.4)
PROT SERPL-MCNC: 8.3 G/DL (ref 6.4–8.2)
RBC # BLD AUTO: 4.97 X10^6/UL (ref 4.38–5.82)
REACTIVE LYMPHS # (MANUAL): 0.18 X10^3/UL (ref 0–0)
REACTIVE LYMPHS % (MANUAL): 1 % (ref 0–0)
SEG#(MANUAL): 13.35 X10^3/UL (ref 1.8–6.8)
SEGS% (MANUAL): 75 % (ref 42–75)

## 2020-03-25 PROCEDURE — 99285 EMERGENCY DEPT VISIT HI MDM: CPT

## 2020-03-25 PROCEDURE — 93005 ELECTROCARDIOGRAM TRACING: CPT

## 2020-03-25 PROCEDURE — 80307 DRUG TEST PRSMV CHEM ANLYZR: CPT

## 2020-03-25 PROCEDURE — 80053 COMPREHEN METABOLIC PANEL: CPT

## 2020-03-25 PROCEDURE — 36415 COLL VENOUS BLD VENIPUNCTURE: CPT

## 2020-03-25 PROCEDURE — 85025 COMPLETE CBC W/AUTO DIFF WBC: CPT

## 2020-03-25 PROCEDURE — 99406 BEHAV CHNG SMOKING 3-10 MIN: CPT

## 2020-03-25 PROCEDURE — 96372 THER/PROPH/DIAG INJ SC/IM: CPT

## 2020-03-25 PROCEDURE — 74177 CT ABD & PELVIS W/CONTRAST: CPT

## 2020-03-25 PROCEDURE — 83690 ASSAY OF LIPASE: CPT

## 2020-03-25 PROCEDURE — 81003 URINALYSIS AUTO W/O SCOPE: CPT

## 2020-03-25 PROCEDURE — 74022 RADEX COMPL AQT ABD SERIES: CPT

## 2020-03-25 NOTE — NUR
Patient BIB [facility] c/o vomiting x20 minutes. Patient states he could be detoxing 
from heroin but it feels different from the last time he detoxed. Last use last 
night. Patient states he ate a ramen bowl which also could have given him food 
poisoning. 

Patient presents vomiting. Respirations even and unlabored.

## 2020-04-09 ENCOUNTER — HOSPITAL ENCOUNTER (EMERGENCY)
Dept: HOSPITAL 8 - ED | Age: 50
Discharge: HOME | End: 2020-04-09
Payer: MEDICAID

## 2020-04-09 VITALS — WEIGHT: 166.89 LBS | BODY MASS INDEX: 25.29 KG/M2 | HEIGHT: 68 IN

## 2020-04-09 VITALS — SYSTOLIC BLOOD PRESSURE: 122 MMHG | DIASTOLIC BLOOD PRESSURE: 80 MMHG

## 2020-04-09 DIAGNOSIS — K02.9: Primary | ICD-10-CM

## 2020-04-09 PROCEDURE — 99283 EMERGENCY DEPT VISIT LOW MDM: CPT

## 2020-06-12 ENCOUNTER — HOSPITAL ENCOUNTER (EMERGENCY)
Dept: HOSPITAL 8 - ED | Age: 50
Discharge: HOME | End: 2020-06-12
Payer: MEDICAID

## 2020-06-12 VITALS — DIASTOLIC BLOOD PRESSURE: 96 MMHG | SYSTOLIC BLOOD PRESSURE: 142 MMHG

## 2020-06-12 VITALS — WEIGHT: 171.74 LBS | BODY MASS INDEX: 26.03 KG/M2 | HEIGHT: 68 IN

## 2020-06-12 VITALS — WEIGHT: 159.84 LBS | HEIGHT: 70 IN | BODY MASS INDEX: 22.88 KG/M2

## 2020-06-12 VITALS — DIASTOLIC BLOOD PRESSURE: 67 MMHG | SYSTOLIC BLOOD PRESSURE: 140 MMHG

## 2020-06-12 DIAGNOSIS — L29.9: Primary | ICD-10-CM

## 2020-06-12 DIAGNOSIS — F17.200: ICD-10-CM

## 2020-06-12 DIAGNOSIS — B35.6: Primary | ICD-10-CM

## 2020-06-12 DIAGNOSIS — F17.290: ICD-10-CM

## 2020-06-12 PROCEDURE — 99283 EMERGENCY DEPT VISIT LOW MDM: CPT

## 2020-06-12 PROCEDURE — 99406 BEHAV CHNG SMOKING 3-10 MIN: CPT

## 2020-06-12 NOTE — NUR
PT RESTING IN [facility] AT THIS TIME; FARRAH. PT VERBALIZES UNDERSTANDING OF ER 
PROCESS.

## 2020-07-06 ENCOUNTER — HOSPITAL ENCOUNTER (EMERGENCY)
Dept: HOSPITAL 8 - ED | Age: 50
End: 2020-07-06
Payer: MEDICAID

## 2020-07-06 VITALS — HEIGHT: 68 IN | WEIGHT: 167.11 LBS | BODY MASS INDEX: 25.33 KG/M2

## 2020-07-06 VITALS — SYSTOLIC BLOOD PRESSURE: 144 MMHG | DIASTOLIC BLOOD PRESSURE: 89 MMHG

## 2020-07-06 DIAGNOSIS — F17.200: ICD-10-CM
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
## 2020-07-06 DIAGNOSIS — T14.8XXA: Primary | ICD-10-CM
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
## 2020-07-06 DIAGNOSIS — Z72.9: ICD-10-CM

## 2020-07-06 PROCEDURE — 99283 EMERGENCY DEPT VISIT LOW MDM: CPT

## 2020-10-05 ENCOUNTER — HOSPITAL ENCOUNTER (EMERGENCY)
Dept: HOSPITAL 8 - ED | Age: 50
Discharge: HOME | End: 2020-10-05
Payer: MEDICAID

## 2020-10-05 VITALS — WEIGHT: 165.57 LBS | BODY MASS INDEX: 25.09 KG/M2 | HEIGHT: 68 IN

## 2020-10-05 VITALS — DIASTOLIC BLOOD PRESSURE: 79 MMHG | SYSTOLIC BLOOD PRESSURE: 162 MMHG

## 2020-10-05 DIAGNOSIS — K08.89: Primary | ICD-10-CM

## 2020-10-05 DIAGNOSIS — B86: ICD-10-CM

## 2020-10-05 PROCEDURE — 99283 EMERGENCY DEPT VISIT LOW MDM: CPT

## 2020-11-07 ENCOUNTER — HOSPITAL ENCOUNTER (EMERGENCY)
Dept: HOSPITAL 8 - ED | Age: 50
Discharge: HOME | End: 2020-11-07
Payer: MEDICAID

## 2020-11-07 VITALS — BODY MASS INDEX: 26.5 KG/M2 | WEIGHT: 174.83 LBS | HEIGHT: 68 IN

## 2020-11-07 VITALS — SYSTOLIC BLOOD PRESSURE: 143 MMHG | DIASTOLIC BLOOD PRESSURE: 67 MMHG

## 2020-11-07 DIAGNOSIS — Z76.0: ICD-10-CM

## 2020-11-07 DIAGNOSIS — K02.9: Primary | ICD-10-CM

## 2020-11-07 PROCEDURE — 99283 EMERGENCY DEPT VISIT LOW MDM: CPT

## 2020-11-24 ENCOUNTER — HOSPITAL ENCOUNTER (EMERGENCY)
Dept: HOSPITAL 8 - ED | Age: 50
Discharge: HOME | End: 2020-11-24
Payer: MEDICAID

## 2020-11-24 VITALS — BODY MASS INDEX: 25.46 KG/M2 | HEIGHT: 68 IN | WEIGHT: 167.99 LBS

## 2020-11-24 VITALS — DIASTOLIC BLOOD PRESSURE: 70 MMHG | SYSTOLIC BLOOD PRESSURE: 140 MMHG

## 2020-11-24 DIAGNOSIS — B86: ICD-10-CM

## 2020-11-24 DIAGNOSIS — K08.89: Primary | ICD-10-CM

## 2020-11-24 DIAGNOSIS — F17.200: ICD-10-CM

## 2020-11-24 PROCEDURE — 99283 EMERGENCY DEPT VISIT LOW MDM: CPT

## 2021-01-25 ENCOUNTER — HOSPITAL ENCOUNTER (EMERGENCY)
Dept: HOSPITAL 8 - ED | Age: 51
Discharge: HOME | End: 2021-01-25
Payer: MEDICAID

## 2021-01-25 VITALS — WEIGHT: 174.17 LBS | BODY MASS INDEX: 26.4 KG/M2 | HEIGHT: 68 IN

## 2021-01-25 VITALS — DIASTOLIC BLOOD PRESSURE: 80 MMHG | SYSTOLIC BLOOD PRESSURE: 138 MMHG

## 2021-01-25 DIAGNOSIS — B35.6: Primary | ICD-10-CM

## 2021-01-25 PROCEDURE — 99282 EMERGENCY DEPT VISIT SF MDM: CPT

## 2021-01-26 ENCOUNTER — HOSPITAL ENCOUNTER (EMERGENCY)
Dept: HOSPITAL 8 - ED | Age: 51
Discharge: HOME | End: 2021-01-26
Payer: MEDICAID

## 2021-01-26 VITALS — SYSTOLIC BLOOD PRESSURE: 126 MMHG | DIASTOLIC BLOOD PRESSURE: 84 MMHG

## 2021-01-26 VITALS — BODY MASS INDEX: 26.4 KG/M2 | WEIGHT: 174.17 LBS | HEIGHT: 68 IN

## 2021-01-26 DIAGNOSIS — F17.210: ICD-10-CM

## 2021-01-26 DIAGNOSIS — B85.3: Primary | ICD-10-CM

## 2021-01-26 DIAGNOSIS — Z72.9: ICD-10-CM

## 2021-01-26 PROCEDURE — 99283 EMERGENCY DEPT VISIT LOW MDM: CPT

## 2021-01-26 NOTE — NUR
PATIENT CLEARED FOR DISCHARGE. NO NOTED NEEDS AT THIS TIME. PATIENT VERBALIZED 
UNDERSTANDING OF SELF CARE AND FOLLOW UP CARE AT HOME. PATIENT GIVEN 
PRESCRIPTIONS, AMBULATORY TO DISCHARGE DESK WITHOUT COMPLICATIONS

## 2021-02-20 ENCOUNTER — HOSPITAL ENCOUNTER (EMERGENCY)
Dept: HOSPITAL 8 - ED | Age: 51
Discharge: HOME | End: 2021-02-20
Payer: MEDICAID

## 2021-02-20 VITALS — DIASTOLIC BLOOD PRESSURE: 68 MMHG | SYSTOLIC BLOOD PRESSURE: 111 MMHG

## 2021-02-20 VITALS — HEIGHT: 68 IN | WEIGHT: 164.69 LBS | BODY MASS INDEX: 24.96 KG/M2

## 2021-02-20 DIAGNOSIS — R11.2: ICD-10-CM

## 2021-02-20 DIAGNOSIS — D72.829: Primary | ICD-10-CM

## 2021-02-20 DIAGNOSIS — K59.00: ICD-10-CM

## 2021-02-20 DIAGNOSIS — R10.84: ICD-10-CM

## 2021-02-20 LAB
ALBUMIN SERPL-MCNC: 3.6 G/DL (ref 3.4–5)
ALP SERPL-CCNC: 117 U/L (ref 45–117)
ALT SERPL-CCNC: 23 U/L (ref 12–78)
ANION GAP SERPL CALC-SCNC: 9 MMOL/L (ref 5–15)
BASOPHILS # BLD AUTO: 0.1 X10^3/UL (ref 0–0.1)
BASOPHILS NFR BLD AUTO: 1 % (ref 0–1)
BILIRUB SERPL-MCNC: 0.1 MG/DL (ref 0.2–1)
CALCIUM SERPL-MCNC: 9 MG/DL (ref 8.5–10.1)
CHLORIDE SERPL-SCNC: 110 MMOL/L (ref 98–107)
CREAT SERPL-MCNC: 0.79 MG/DL (ref 0.7–1.3)
EOSINOPHIL # BLD AUTO: 0.1 X10^3/UL (ref 0–0.4)
EOSINOPHIL NFR BLD AUTO: 1 % (ref 1–7)
ERYTHROCYTE [DISTWIDTH] IN BLOOD BY AUTOMATED COUNT: 14.2 % (ref 9.4–14.8)
LYMPHOCYTES # BLD AUTO: 2.5 X10^3/UL (ref 1–3.4)
LYMPHOCYTES NFR BLD AUTO: 12 % (ref 22–44)
MCH RBC QN AUTO: 30.7 PG (ref 27.5–34.5)
MCHC RBC AUTO-ENTMCNC: 34.2 G/DL (ref 33.2–36.2)
MD: (no result)
MICROSCOPIC: (no result)
MONOCYTES # BLD AUTO: 0.8 X10^3/UL (ref 0.2–0.8)
MONOCYTES NFR BLD AUTO: 4 % (ref 2–9)
NEUTROPHILS # BLD AUTO: 17.5 X10^3/UL (ref 1.8–6.8)
NEUTROPHILS NFR BLD AUTO: 83 % (ref 42–75)
PLATELET # BLD AUTO: 372 X10^3/UL (ref 130–400)
PMV BLD AUTO: 7.1 FL (ref 7.4–10.4)
PROT SERPL-MCNC: 8.3 G/DL (ref 6.4–8.2)
RBC # BLD AUTO: 4.97 X10^6/UL (ref 4.38–5.82)

## 2021-02-20 PROCEDURE — 96376 TX/PRO/DX INJ SAME DRUG ADON: CPT

## 2021-02-20 PROCEDURE — 80053 COMPREHEN METABOLIC PANEL: CPT

## 2021-02-20 PROCEDURE — 99285 EMERGENCY DEPT VISIT HI MDM: CPT

## 2021-02-20 PROCEDURE — 81003 URINALYSIS AUTO W/O SCOPE: CPT

## 2021-02-20 PROCEDURE — 85025 COMPLETE CBC W/AUTO DIFF WBC: CPT

## 2021-02-20 PROCEDURE — 36415 COLL VENOUS BLD VENIPUNCTURE: CPT

## 2021-02-20 PROCEDURE — 96374 THER/PROPH/DIAG INJ IV PUSH: CPT

## 2021-02-20 PROCEDURE — 96372 THER/PROPH/DIAG INJ SC/IM: CPT

## 2021-02-20 PROCEDURE — 96361 HYDRATE IV INFUSION ADD-ON: CPT

## 2021-02-20 PROCEDURE — 83690 ASSAY OF LIPASE: CPT

## 2021-02-20 PROCEDURE — 74177 CT ABD & PELVIS W/CONTRAST: CPT

## 2021-02-20 PROCEDURE — 96375 TX/PRO/DX INJ NEW DRUG ADDON: CPT

## 2021-02-20 PROCEDURE — 74022 RADEX COMPL AQT ABD SERIES: CPT

## 2021-02-20 NOTE — NUR
PT IV ATTEMPTED BY TIAN AND WILLI ARREOLA, EJ ATTEMPTED BY WILLI ARREOLA.  UNSUCCESSFUL.  
MEDICATION UNABLE TO BE GIVEN AFTER 6 ATTEMPTS BY 3 RNS.  JOSE MARSH AWARE.

## 2021-03-10 ENCOUNTER — HOSPITAL ENCOUNTER (EMERGENCY)
Dept: HOSPITAL 8 - ED | Age: 51
Discharge: HOME | End: 2021-03-10
Payer: MEDICAID

## 2021-03-10 VITALS — DIASTOLIC BLOOD PRESSURE: 101 MMHG | SYSTOLIC BLOOD PRESSURE: 184 MMHG

## 2021-03-10 VITALS — HEIGHT: 67 IN | BODY MASS INDEX: 25.95 KG/M2 | WEIGHT: 165.35 LBS

## 2021-03-10 VITALS — BODY MASS INDEX: 25.69 KG/M2 | HEIGHT: 68 IN | WEIGHT: 169.54 LBS

## 2021-03-10 VITALS — SYSTOLIC BLOOD PRESSURE: 129 MMHG | DIASTOLIC BLOOD PRESSURE: 74 MMHG

## 2021-03-10 DIAGNOSIS — R10.30: ICD-10-CM

## 2021-03-10 DIAGNOSIS — R10.13: ICD-10-CM

## 2021-03-10 DIAGNOSIS — R19.7: ICD-10-CM

## 2021-03-10 DIAGNOSIS — R11.2: Primary | ICD-10-CM

## 2021-03-10 DIAGNOSIS — K59.00: ICD-10-CM

## 2021-03-10 LAB
ALBUMIN SERPL-MCNC: 4.2 G/DL (ref 3.4–5)
ALP SERPL-CCNC: 130 U/L (ref 45–117)
ALT SERPL-CCNC: 28 U/L (ref 12–78)
ANION GAP SERPL CALC-SCNC: 8 MMOL/L (ref 5–15)
BASOPHILS # BLD AUTO: 0.1 X10^3/UL (ref 0–0.1)
BASOPHILS NFR BLD AUTO: 0 % (ref 0–1)
BILIRUB SERPL-MCNC: 0.3 MG/DL (ref 0.2–1)
CALCIUM SERPL-MCNC: 9.8 MG/DL (ref 8.5–10.1)
CHLORIDE SERPL-SCNC: 106 MMOL/L (ref 98–107)
CREAT SERPL-MCNC: 0.98 MG/DL (ref 0.7–1.3)
EOSINOPHIL # BLD AUTO: 0.1 X10^3/UL (ref 0–0.4)
EOSINOPHIL NFR BLD AUTO: 1 % (ref 1–7)
ERYTHROCYTE [DISTWIDTH] IN BLOOD BY AUTOMATED COUNT: 14.9 % (ref 9.4–14.8)
LYMPHOCYTES # BLD AUTO: 2.8 X10^3/UL (ref 1–3.4)
LYMPHOCYTES NFR BLD AUTO: 14 % (ref 22–44)
MCH RBC QN AUTO: 30.7 PG (ref 27.5–34.5)
MCHC RBC AUTO-ENTMCNC: 33.4 G/DL (ref 33.2–36.2)
MD: (no result)
MONOCYTES # BLD AUTO: 0.8 X10^3/UL (ref 0.2–0.8)
MONOCYTES NFR BLD AUTO: 4 % (ref 2–9)
NEUTROPHILS # BLD AUTO: 16.5 X10^3/UL (ref 1.8–6.8)
NEUTROPHILS NFR BLD AUTO: 81 % (ref 42–75)
PLATELET # BLD AUTO: 274 X10^3/UL (ref 130–400)
PMV BLD AUTO: 7.3 FL (ref 7.4–10.4)
PROT SERPL-MCNC: 8.9 G/DL (ref 6.4–8.2)
RBC # BLD AUTO: 5.46 X10^6/UL (ref 4.38–5.82)

## 2021-03-10 PROCEDURE — 74021 RADEX ABDOMEN 3+ VIEWS: CPT

## 2021-03-10 PROCEDURE — 83690 ASSAY OF LIPASE: CPT

## 2021-03-10 PROCEDURE — 85025 COMPLETE CBC W/AUTO DIFF WBC: CPT

## 2021-03-10 PROCEDURE — 96375 TX/PRO/DX INJ NEW DRUG ADDON: CPT

## 2021-03-10 PROCEDURE — 36415 COLL VENOUS BLD VENIPUNCTURE: CPT

## 2021-03-10 PROCEDURE — 74176 CT ABD & PELVIS W/O CONTRAST: CPT

## 2021-03-10 PROCEDURE — 99285 EMERGENCY DEPT VISIT HI MDM: CPT

## 2021-03-10 PROCEDURE — 80053 COMPREHEN METABOLIC PANEL: CPT

## 2021-03-10 PROCEDURE — 96374 THER/PROPH/DIAG INJ IV PUSH: CPT

## 2021-03-10 PROCEDURE — 99281 EMR DPT VST MAYX REQ PHY/QHP: CPT

## 2021-03-10 NOTE — NUR
md in to assess, pt in bed with no signs or symptoms of acute distress noted, 
respirations even and unlabored. md is the same ed physician that saw pt 
earlier today, states that pt workup was reassuring and that theres no reason 
to believe that pt has anything sinister going on in his case. pt plan of care 
is for dc to home and to fill prescription from earlier today.

## 2021-03-10 NOTE — NUR
Report received from MAXWELL Hernandez and care assumed. US at bedside and lab present 
for second set of BC. 

-------------------------------------------------------------------------------

Addendum: 03/10/21 at 1012 by RANCHO

-------------------------------------------------------------------------------

No US or lab at bedside. that is for a different patient.

## 2021-03-10 NOTE — NUR
IV started, meds given IV with site benign,  able to draw labs at 
another site, and pt then taken by riya to radiology with rad tech.

## 2021-03-10 NOTE — NUR
[place] tech without acute changes while off unit. 
Pt states nausea is mostly resolved after med admin on reassessment but 
continues to state pain is severe at 8/10 in lower back. Pt unable to take his 
normal daily Methadone secondary to abd pain and nausea.

## 2021-03-10 NOTE — NUR
Labs and XR results reviewed by RN, pt back from radiology without acute change 
noted, and chart marked for recheck.

## 2021-03-10 NOTE — NUR
THIS IS A 51 YO M W/ C/O NAUSEA STARTING THIS MORNING. PT REPORTS HE WAS 
CONSTIPATED FOR X2 WEEKS BUT STARTED HAVING DIARRHEA TODAY. PT RESTING ON 
Combined Effort W/ CALL LIGHT IN REACH AND SIDE RAILS UPX2. RESP EVEN AND UNLABORED, 
FARRAH. AWAITING ED EVAL.

## 2021-03-10 NOTE — NUR
Pt tolerating PO fluids without emesis. States he feels he may have the runs 
and has a small amount of nausea, but no emesis and has not actually had a bout 
of diarrhea while here yet.

## 2021-03-15 ENCOUNTER — HOSPITAL ENCOUNTER (EMERGENCY)
Dept: HOSPITAL 8 - ED | Age: 51
Discharge: HOME | End: 2021-03-15
Payer: MEDICAID

## 2021-03-15 VITALS — HEIGHT: 68 IN | WEIGHT: 169.76 LBS | BODY MASS INDEX: 25.73 KG/M2

## 2021-03-15 VITALS — SYSTOLIC BLOOD PRESSURE: 152 MMHG | DIASTOLIC BLOOD PRESSURE: 92 MMHG

## 2021-03-15 DIAGNOSIS — R51.9: ICD-10-CM

## 2021-03-15 DIAGNOSIS — K08.89: ICD-10-CM

## 2021-03-15 DIAGNOSIS — K02.9: Primary | ICD-10-CM

## 2021-03-15 PROCEDURE — 99283 EMERGENCY DEPT VISIT LOW MDM: CPT

## 2021-04-20 ENCOUNTER — HOSPITAL ENCOUNTER (EMERGENCY)
Dept: HOSPITAL 8 - ED | Age: 51
Discharge: HOME | End: 2021-04-20
Payer: MEDICAID

## 2021-04-20 VITALS — HEIGHT: 68 IN | WEIGHT: 169.09 LBS | BODY MASS INDEX: 25.63 KG/M2

## 2021-04-20 VITALS — SYSTOLIC BLOOD PRESSURE: 124 MMHG | DIASTOLIC BLOOD PRESSURE: 71 MMHG

## 2021-04-20 DIAGNOSIS — K04.7: Primary | ICD-10-CM

## 2021-04-20 DIAGNOSIS — F11.10: ICD-10-CM

## 2021-04-20 DIAGNOSIS — F17.210: ICD-10-CM

## 2021-04-20 DIAGNOSIS — Z72.9: ICD-10-CM

## 2021-04-20 DIAGNOSIS — R22.9: ICD-10-CM

## 2021-04-20 DIAGNOSIS — R51.9: ICD-10-CM

## 2021-04-20 PROCEDURE — 99284 EMERGENCY DEPT VISIT MOD MDM: CPT

## 2021-04-20 PROCEDURE — 99406 BEHAV CHNG SMOKING 3-10 MIN: CPT

## 2021-04-20 PROCEDURE — 64400 NJX AA&/STRD TRIGEMINAL NRV: CPT

## 2021-04-20 NOTE — NUR
PT C/O OF RIGHT FACIAL SWELLING. PT STATES HE HAS A HEADACHE AND A ABSCESS. 
REPORTS SYMPTOMS HAVE STARTED THIS MORNING. ATTACHED TO MONITORS. RESTING IN 
BED. VSS